# Patient Record
Sex: FEMALE | Race: WHITE | URBAN - METROPOLITAN AREA
[De-identification: names, ages, dates, MRNs, and addresses within clinical notes are randomized per-mention and may not be internally consistent; named-entity substitution may affect disease eponyms.]

---

## 2024-11-25 ENCOUNTER — APPOINTMENT (RX ONLY)
Dept: URBAN - METROPOLITAN AREA CLINIC 356 | Facility: CLINIC | Age: 89
Setting detail: DERMATOLOGY
End: 2024-11-25

## 2024-11-25 DIAGNOSIS — B00.1 HERPESVIRAL VESICULAR DERMATITIS: ICD-10-CM | Status: STABLE

## 2024-11-25 DIAGNOSIS — K13.0 DISEASES OF LIPS: ICD-10-CM

## 2024-11-25 PROCEDURE — ? PRESCRIPTION MEDICATION MANAGEMENT

## 2024-11-25 PROCEDURE — ? ADDITIONAL NOTES

## 2024-11-25 PROCEDURE — ? COUNSELING

## 2024-11-25 PROCEDURE — 99203 OFFICE O/P NEW LOW 30 MIN: CPT

## 2024-11-25 PROCEDURE — ? PRESCRIPTION

## 2024-11-25 RX ORDER — NYSTATIN OINTMENT 100000 [USP'U]/G
1 OINTMENT TOPICAL AS DIRECTED
Qty: 15 | Refills: 1 | Status: ERX | COMMUNITY
Start: 2024-11-25

## 2024-11-25 RX ORDER — HYDROCORTISONE 25 MG/G
1 OINTMENT TOPICAL AS DIRECTED
Qty: 20 | Refills: 0 | Status: ERX | COMMUNITY
Start: 2024-11-25

## 2024-11-25 RX ORDER — MUPIROCIN 20 MG/G
1 OINTMENT TOPICAL AS DIRECTED
Qty: 15 | Refills: 1 | Status: ERX | COMMUNITY
Start: 2024-11-25

## 2024-11-25 RX ADMIN — NYSTATIN OINTMENT 1: 100000 OINTMENT TOPICAL at 00:00

## 2024-11-25 RX ADMIN — HYDROCORTISONE 1: 25 OINTMENT TOPICAL at 00:00

## 2024-11-25 RX ADMIN — MUPIROCIN 1: 20 OINTMENT TOPICAL at 00:00

## 2024-11-25 ASSESSMENT — LOCATION DETAILED DESCRIPTION DERM
LOCATION DETAILED: LEFT ORAL COMMISSURE
LOCATION DETAILED: RIGHT ORAL COMMISSURE

## 2024-11-25 ASSESSMENT — LOCATION ZONE DERM: LOCATION ZONE: LIP

## 2024-11-25 ASSESSMENT — LOCATION SIMPLE DESCRIPTION DERM
LOCATION SIMPLE: RIGHT LIP
LOCATION SIMPLE: LEFT LIP

## 2024-11-25 NOTE — HPI: RASH
What Type Of Note Output Would You Prefer (Optional)?: Bullet Format
Is This A New Presentation, Or A Follow-Up?: Rash
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 Pt states she is on valtrex and TAC when needed. Pt states flare at angles of mouth has lasted about month, but it has been recurrent for years. Also reports she saw her dentist who stated she may have thrush and recommended an otc Listerine mouthwash which she has at home but does not use regularly.

## 2024-11-25 NOTE — PROCEDURE: ADDITIONAL NOTES
Detail Level: Simple
Render Risk Assessment In Note?: no
Additional Notes: No evidence of HSV flare today. Pt reports she recently took Valtrex rx'd by her PCP which seemed to help clear it up.
Additional Notes: Pt to follow with her dentist for suspected oral thrush and resume regular use of the Listerine wash as her dentist recommended. If fails to improve, t/c rx nystatin rinse 5mL swish and spit 3-4x daily.

## 2024-11-25 NOTE — PROCEDURE: PRESCRIPTION MEDICATION MANAGEMENT
Detail Level: Zone
Plan: Regular use of Aquaphor or Vaseline throughout the day
Render In Strict Bullet Format?: No
Initiate Treatment: hydrocortisone 2.5 % topical ointment \\nSig: Mix equal parts with Mupirocin and nystatin then apply to corner of lips bid prn flares. May use up to 2 wks per month.\\n\\nmupirocin 2 % topical ointment \\nSig: Mix equal parts with hydrocortisone and nystatin then apply to corner of lips bid prn flares. May use up to 2 wks per month.\\n\\nnystatin 100,000 unit/gram topical ointment \\nSig: Mix equal parts with Mupirocin and hydrocortisone then apply to corner of lips bid prn flares. May use up to 2 wks per month.
normal (ped)...